# Patient Record
Sex: MALE | Race: WHITE
[De-identification: names, ages, dates, MRNs, and addresses within clinical notes are randomized per-mention and may not be internally consistent; named-entity substitution may affect disease eponyms.]

---

## 2019-04-09 ENCOUNTER — HOSPITAL ENCOUNTER (EMERGENCY)
Dept: HOSPITAL 10 - FTE | Age: 19
Discharge: HOME | End: 2019-04-09
Payer: MEDICAID

## 2019-04-09 ENCOUNTER — HOSPITAL ENCOUNTER (EMERGENCY)
Dept: HOSPITAL 91 - FTE | Age: 19
Discharge: HOME | End: 2019-04-09
Payer: MEDICAID

## 2019-04-09 VITALS
BODY MASS INDEX: 23.09 KG/M2 | BODY MASS INDEX: 23.09 KG/M2 | HEIGHT: 71 IN | WEIGHT: 164.91 LBS | WEIGHT: 164.91 LBS | HEIGHT: 71 IN

## 2019-04-09 VITALS — HEART RATE: 86 BPM | RESPIRATION RATE: 20 BRPM | SYSTOLIC BLOOD PRESSURE: 127 MMHG | DIASTOLIC BLOOD PRESSURE: 75 MMHG

## 2019-04-09 DIAGNOSIS — L60.0: Primary | ICD-10-CM

## 2019-04-09 PROCEDURE — 99283 EMERGENCY DEPT VISIT LOW MDM: CPT

## 2019-04-09 PROCEDURE — 11765 WEDGE EXCISION SKN NAIL FOLD: CPT

## 2019-04-09 RX ADMIN — LIDOCAINE HYDROCHLORIDE 1 ML: 10 INJECTION, SOLUTION EPIDURAL; INFILTRATION; INTRACAUDAL; PERINEURAL at 16:37

## 2019-04-10 NOTE — ERD
ER Documentation


Chief Complaint


Chief Complaint





Left big toe pain x1 month





HPI


18-year-old male patient with no significant past medical history presents to ED


complaining of left toe pain that started 1 month ago.  States that he has an 


ingrown toenail.  States that it got worse.  Denies any trauma or injuries.  D


enies any fever, chills, nausea, vomiting, diarrhea, neck stiffness.





ROS


All systems reviewed and are negative except as per history of present illness.





Medications


Home Meds


Active Scripts


Ibuprofen* (Motrin*) 600 Mg Tab, 600 MG PO Q6, #30 TAB


   Prov:MALLIKA ONTIVEROS PA-C         4/9/19


Cephalexin* (Keflex*) 500 Mg Capsule, 500 MG PO QID for 7 Days, CAP


   Prov:MALLIKA ONTIVEROS PA-C         4/9/19





Allergies


Allergies:  


Coded Allergies:  


     No Known Allergy (Unverified , 4/9/19)





PMhx/Soc


Medical and Surgical Hx:  pt denies Medical Hx, pt denies Surgical Hx


Hx Alcohol Use:  No


Hx Substance Use:  No


Hx Tobacco Use:  No


Smoking Status:  Never smoker





FmHx


Family History:  No diabetes, No coronary disease





Physical Exam


Vitals





Vital Signs


  Date      Temp  Pulse  Resp  B/P (MAP)   Pulse Ox  O2          O2 Flow    FiO2


Time                                                 Delivery    Rate


    4/9/19  98.4     86    20      127/75        98


     14:56                           (92)





Physical Exam


Const: Non-ill-appearing, well-nourished. In no acute distress.


Head: Atraumatic, normocephalic 


Eyes: Normal Conjunctiva without injection 


ENT: Normal external ear, nose and mouth. 


Neck: Full range of motion. No meningismus. 


Resp: Clear to auscultation bilaterally. No wheezing, rhonchi, rales, or 


crackles. No accessory muscle use. No retractions.


Cardio: Regular rate and rhythm, no murmurs


Skin: No petechiae or rashes


Back: No midline tenderness. No CVA tenderness.


Ext: No cyanosis, or edema. Cap refill less than 2 seconds. Distal pulses intact


bilaterally.  Ingrown toenail noted of the left great toe and the lateral aspect


of patient's great toe.  Minimal bleeding noted.  Onychomycosis noted of toes.


Neur: Awake and alert. Normal gait and coordination. Muscle strength 5/5. 


Sensation intact bilaterally.


Psych: Normal Mood and Affect


Results 24 hrs





Current Medications


 Medications
   Dose
          Sig/Chely
       Start Time
   Status  Last


 (Trade)       Ordered        Route
 PRN     Stop Time              Admin
Dose


                              Reason                                Admin


 Lidocaine
     5 ml           ONCE  ONCE
    4/9/19        DC       



(Xylocaine                    INJ
           17:00
 4/9/19


1%
  (Mpf))                                  17:01








Procedures/MDM


18-year-old male patient with no significant past medical history presents ED 


complaining of left great toe that started 1 month ago due to an ingrown 


toenail.  Patient is afebrile and nontoxic-appearing.  The left great toe was 


cleaned with Betadine for sterilization.  A digital block was performed of the 


left great toe using 5 cc of 1% lidocaine.  The lateral aspect of patient's left


great toe was removed with the base of the nail bed with a size of about 1 cm 


removing the ingrown toenail with success.  No complications.  Patient tolerated


procedure.  Clean dressing applied. No evidence of fractures, dislocations, 


compartment syndrome, neurologic injury, vascular injury, open joint, open 


fracture, tendon laceration, septic arthritis, osteomyelitis, DVT, foreign body,


or other emergent conditions.





Diagnosis: Ingrown nail


Discharge medications: Ibuprofen, Keflex 


Follow up with primary care physician in 1-2 days. Instructed patient to return 


to the ED sooner for any worsening symptoms. Patient's questions were answered. 


Patient is hemodynamically stable. Patient understood and agreed with discharge 


plan. Patient discharged stable.





Disclaimer: Inadvertent spelling and grammatical errors are likely due to 


EHR/dictation software use and do not reflect on the overall quality of patient 


care. Also, please note that the electronic time recorded on this note does not 


necessarily reflect the actual time of the patient encounter.





Departure


Diagnosis:  


   Primary Impression:  


   Ingrown toenail of left foot


Condition:  Stable


Patient Instructions:  Ingrown Toenail, Excised, Ingrown Toenail, Infected (Abx 


Only)


Referrals:  


COMM[facility]ITY CLINICS


YOU HAVE RECEIVED A MEDICAL SCREENING EXAM AND THE RESULTS INDICATE THAT YOU DO 


NOT HAVE A CONDITION THAT REQUIRES URGENT TREATMENT IN THE EMERGENCY DEPARTMENT.





FURTHER EVALUATION AND TREATMENT OF YOUR CONDITION CAN WAIT UNTIL YOU ARE SEEN 


IN YOUR DOCTORS OFFICE WITHIN THE NEXT 1-2 DAYS. IT IS YOUR RESPONSIBILITY TO 


MAKE AN APPOINTMENT FOR FOLOW-UP CARE.





IF YOU HAVE A PRIMARY DOCTOR


--you should call your primary doctor and schedule an appointment





IF YOU DO NOT HAVE A PRIMARY DOCTOR YOU CAN CALL OUR PHYSICIAN REFERRAL HOTLINE 


AT


 [phone] 





IF YOU CAN NOT AFFORD TO SEE A PHYSICIAN YOU CAN CHOSE FROM THE FOLLOWING 


[facility] [phone] [address] [place]





[facility] [phone] [address] [facility]





[facility] [phone] [address] [facility] [phone] [address] [facility] [phone] [address] [facility]. [facility] [phone] [address] [facility] [address]. [facility]


YOU HAVE RECEIVED A MEDICAL SCREENING EXAM AND THE RESULTS INDICATE THAT YOU DO 


NOT HAVE A CONDITION THAT REQUIRES URGENT TREATMENT IN THE EMERGENCY DEPARTMENT.





FURTHER EVALUATION AND TREATMENT OF YOUR CONDITION CAN WAIT UNTIL YOU ARE SEEN 


IN YOUR DOCTORS OFFICE WITHIN THE NEXT 1-2 DAYS. IT IS YOUR RESPONSIBILITY TO 


MAKE AN APPOINTMENT FOR FOLOW-UP CARE.





IF YOU HAVE A PRIMARY DOCTOR


--you should call your primary doctor and schedule and appointment





IF YOU DO NOT HAVE A PRIMARY DOCTOR YOU CAN CALL OUR PHYSICIAN REFERRAL HOTLINE 


AT [phone].





IF YOU CAN NOT AFFORD TO SEE A PHYSICIAN YOU CAN CHOSE FROM THE FOLLOWING [facility]:





[facility]


[address] [place] [address]





[facility]


[address], [place] [address]


[address] [place] [address]





[facility] URGENT CARE/SPECIALTIES





Additional Instructions:  


Llame podologo MAANA y pauline kelechi MAR PARA DENTRO DE 2-3 BENITEZ.Dgale a la 


secretaria que nosotros le instruimos hacer esta mar.Avise o llame si block 


condicin se empeora antes de la mar. Regresa aqui si peor o no mejor.











MALLIKA ONTIVEROS PA-C              Apr 10, 2019 14:56